# Patient Record
Sex: FEMALE | Race: OTHER | HISPANIC OR LATINO | URBAN - METROPOLITAN AREA
[De-identification: names, ages, dates, MRNs, and addresses within clinical notes are randomized per-mention and may not be internally consistent; named-entity substitution may affect disease eponyms.]

---

## 2023-08-11 ENCOUNTER — EMERGENCY (EMERGENCY)
Facility: HOSPITAL | Age: 23
LOS: 1 days | Discharge: HOME | End: 2023-08-11
Attending: EMERGENCY MEDICINE
Payer: MEDICAID

## 2023-08-11 VITALS
HEIGHT: 55 IN | HEART RATE: 84 BPM | SYSTOLIC BLOOD PRESSURE: 110 MMHG | OXYGEN SATURATION: 97 % | TEMPERATURE: 99 F | WEIGHT: 130.07 LBS | RESPIRATION RATE: 20 BRPM | DIASTOLIC BLOOD PRESSURE: 73 MMHG

## 2023-08-11 PROCEDURE — 99283 EMERGENCY DEPT VISIT LOW MDM: CPT

## 2023-08-11 RX ORDER — METHOCARBAMOL 500 MG/1
2 TABLET, FILM COATED ORAL
Qty: 40 | Refills: 0
Start: 2023-08-11 | End: 2023-08-15

## 2023-08-11 RX ORDER — IBUPROFEN 200 MG
400 TABLET ORAL ONCE
Refills: 0 | Status: COMPLETED | OUTPATIENT
Start: 2023-08-11 | End: 2023-08-11

## 2023-08-11 RX ORDER — METHOCARBAMOL 500 MG/1
1000 TABLET, FILM COATED ORAL ONCE
Refills: 0 | Status: COMPLETED | OUTPATIENT
Start: 2023-08-11 | End: 2023-08-11

## 2023-08-11 RX ORDER — IBUPROFEN 200 MG
1 TABLET ORAL
Qty: 30 | Refills: 0
Start: 2023-08-11

## 2023-08-11 NOTE — ED PROVIDER NOTE - PHYSICAL EXAMINATION
Patient able to open and close mouth and full range of motion and states has normal teeth lie when teeth are approximated.    Patient points to bilateral TMJ as location of tenderness.  No bony deformities.

## 2023-08-11 NOTE — ED PROVIDER NOTE - NSFOLLOWUPINSTRUCTIONS_ED_ALL_ED_FT
El trastorno temporomandibular es muriel condición que provoca dolor en la mandíbula. El trastorno afecta la articulación entre el hueso temporal y la mandíbula (maxilar). Los nervios de los músculos alrededor de la articulación también se afectan.  Mandíbula    ¿Qué provoca el trastorno temporomandibular?    La dislocación de un disco del cartílago en la articulación    Deformaciones de la mandíbula    Inflamación, infección, artritis, problemas musculares o tumores en el área de la mandíbula    Lesión o fractura de la mandíbula    Tensión muscular debido a masticar, a apretar los dientes o a rechinarlos  ¿Cuáles son los signos y síntomas de un trastorno temporomandibular?    Un khurram de estallido o rechinido de los dientes cuando se abre o joan la mandíbula    Dolor de lilly o dolor en la mandíbula, oído, jhonatan o derrick    Dolor o inflamación de los músculos de la mandíbula    Hormigueo o adormecimiento en la mandíbula o derrick    Dificultad para abrir o cerrar la boca, o la mandíbula se traba  ¿Cómo se diagnostica un trastorno temporomandibular?El médico le examinará la mandíbula, la derrick y el jhonatan. Meza médico le preguntará acerca de feliciano afecciones médicas o lesiones. También es posible que deba hacerse los siguientes exámenes:    Radiografía:Usted podría necesitar radiografías del cráneo, de la mandíbula o de los dientes.    Artrograma:[place] es muriel radiografía que utiliza líquido de contraste para ayudar a que las imágenes se vean mejor. Dígale al médico si usted alguna vez ha tenido muriel reacción alérgica al líquido de contraste.    Imágenes por resonancia magnética (IRM):Éste examen usa imanes de poder y muriel computadora para rogelio imágenes de la mandíbula. Es posible que le administren un líquido de contraste para que las imágenes se aprecien mejor. Dígale al médico si usted alguna vez ha tenido muriel reacción alérgica al líquido de contraste. No entre a la joanne donde se realiza la resonancia magnética con algo de metal. El metal puede causar lesiones serias. Dígale al médico si usted tiene algo de metal dentro de meza cuerpo o por encima.    Exploración ósea:Muriel gammagrafía ósea se utiliza para detectar enfermedades o daños en los huesos. Le administrarán un líquido radioactivo, llamado trazador, por muriel vena del brazo. El trazador se acumula en feliciano huesos. Luego se linda imágenes para buscar algún problema. Ejemplos de problemas en los huesos incluyen fracturas e infección.  ¿Cómo se trata el trastorno temporomandibular?    Medicamentos:  JESUSITA (analgésicos antiinflamatorios no esteroides):Estos medicamentos disminuyen la inflamación y el dolor. Usted puede adquirir los medicamentos JESUSITA sin receta médica. Pregunte a meza médico cuál medicamento es adecuado para usted y cuánto debería rogelio. Tómelos mitch se le indique. Cuando no se linda de la manera indicada, los medicamentos antiinflamatorios no esteroides pueden causar sangrado estomacal o problemas renales.    Toxina botulínica (bótox):Esta podría inyectarse en los músculos de la mandíbula para disminuir el dolor.    Medicamentos esteroides:Estos podrían inyectarse en la articulación para disminuir el dolor y la inflamación.    Relajantes muscularesayudan a reducir dolor y espasmos musculares.    Cirugía:Es posible que usted necesite de muriel cirugía para arreglarle los dientes, el maxilar o las articulaciones.  ¿Cuáles son los riesgos del trastorno temporomadibular?Usted podría sangrar o contraer muriel infección si le realizan muriel cirugía. Si no recibe tratamiento, la condición podría empeorar. Es posible que tenga dificultad para respirar, comer, beber, hablar o abrir la boca. Si no se trata a tiempo, el trastorno temporomadibular podría conllevar a lesiones permanentes, mitch daño a los nervios, deformidad o parálisis.    ¿Cómo puedo controlar los síntomas?    Consuma alimentos blandos:Meza médico podría sugerirle que consuma solo alimentos blandos por varios días. Un dietista podría trabajar con usted para encontrar los alimentos que son más fáciles de morder, masticar y tragar. Los ejemplos son sopa, puré de manzana, requesón, pudín, yogurt y frutas blandas.    Utilice dispositivos de apoyo para la mandíbula:Podrían usarse tablillas para apoyar la mandíbula o evitar que se mueva. Es posible que usted necesite usar un protector bucal para evitar que apriete o rechine los dientes mientras duerme.    Use hielo y calor:El hielo ayuda a disminuir la inflamación y el dolor. El hielo también puede contribuir a evitar el daño de los tejidos. Use muriel compresa de hielo o ponga hielo triturado en muriel bolsa de plástico. Cúbrala con muriel toalla y colóquela sobre la mandíbula por 15 a 20 minutos cada hora o mitch se le indique. Después de las primeras 24 a 48 horas, use calor para disminuir el dolor, la inflamación y los espasmos musculares. Aplíquese calor en el área lesionada karen 20 a 30 minutos cada 2 horas karen la cantidad de días que le indiquen.    Vaya a terapia física:Un fisioterapeuta le enseña ejercicios para ayudarlo a mejorar el movimiento y la fuerza, así mitch para disminuir el dolor en la mandíbula. Un terapista de lenguaje podría ayudarlo con ejercicios para tragar y hablar.  ¿Cuándo dallin comunicarme con mi médico?    Tiene fiebre.    La tablilla o protector bucal están flojos.    Usted tiene preguntas o inquietudes acerca de meza condición o cuidado.  ¿Cuándo dallin buscar atención inmediata o llamar al 911?    Tiene náusea, vómitos o no puede retener líquidos en el estómago.    Usted tiene dolor que no se tabitha, incluso después de rogelio el medicamento para el dolor.    Tiene dificultad para respirar, hablar, beber, comer o tragar.    La tablilla o el protector bucal se dañan o se quiebran.

## 2023-08-11 NOTE — ED PROVIDER NOTE - PATIENT PORTAL LINK FT
You can access the FollowMyHealth Patient Portal offered by [facility] by registering at the following website: [email or web address]. By joining [employer]’s FollowMyHealth portal, you will also be able to view your health information using other applications (apps) compatible with our system.

## 2023-08-11 NOTE — ED PROVIDER NOTE - OBJECTIVE STATEMENT
#[id number].  Patient states she was eating at 3 PM and then developed bilateral jaw tenderness.  Patient able to open and close mouth and full range of motion and states has normal teeth lie when teeth are approximated.  Patient denies any trauma.  No chest pain, no shortness of breath, no fever.  No excessive drooling.  Patient has no difficulty swallowing.

## 2023-08-11 NOTE — ED ADULT TRIAGE NOTE - CHIEF COMPLAINT QUOTE
bilateral jaw pain, Lt facial swelling while eating rice 3 hrs ago, pt speaking clear full sentences

## 2023-08-11 NOTE — ED PROVIDER NOTE - CLINICAL SUMMARY MEDICAL DECISION MAKING FREE TEXT BOX
Patient's symptoms are consistent with TMJ dysfunction.  Patient has currently normal mouth opening and closing and normal teeth approximation.  Patient has no bony deformities.  Will provide ibuprofen and Robaxin for analgesia and muscle relaxant.  Return precautions explained and questions answered.  Pt is well appearing, has no new complaints and able to walk with normal gait. Pt is stable for discharge and follow up with medical doctor. Pt educated on care and need for follow up. Discussed anticipatory guidance and return precautions. Questions answered. I had a detailed discussion with the patient regarding the historical points, exam findings, and any diagnostic results supporting the discharge diagnosis.

## 2023-08-11 NOTE — ED PROVIDER NOTE - NSFOLLOWUPCLINICS_GEN_ALL_ED_FT
[facility]  Internal Medicine  [address] [place] [address]  Phone: [phone]  Fax: [phone]

## 2023-08-12 VITALS
DIASTOLIC BLOOD PRESSURE: 80 MMHG | TEMPERATURE: 98 F | HEART RATE: 67 BPM | RESPIRATION RATE: 18 BRPM | SYSTOLIC BLOOD PRESSURE: 118 MMHG | OXYGEN SATURATION: 99 %

## 2023-08-12 PROCEDURE — 99283 EMERGENCY DEPT VISIT LOW MDM: CPT

## 2023-08-12 RX ADMIN — Medication 400 MILLIGRAM(S): at 00:41

## 2023-08-12 RX ADMIN — METHOCARBAMOL 1000 MILLIGRAM(S): 500 TABLET, FILM COATED ORAL at 00:11

## 2023-08-12 RX ADMIN — Medication 400 MILLIGRAM(S): at 00:11
